# Patient Record
Sex: FEMALE | Race: WHITE
[De-identification: names, ages, dates, MRNs, and addresses within clinical notes are randomized per-mention and may not be internally consistent; named-entity substitution may affect disease eponyms.]

---

## 2019-06-22 ENCOUNTER — HOSPITAL ENCOUNTER (EMERGENCY)
Dept: HOSPITAL 91 - E/R | Age: 49
Discharge: ANOTHER HOSPITAL | End: 2019-06-22
Payer: COMMERCIAL

## 2019-06-22 ENCOUNTER — HOSPITAL ENCOUNTER (EMERGENCY)
Dept: HOSPITAL 10 - E/R | Age: 49
Discharge: ANOTHER HOSPITAL | End: 2019-06-22
Payer: COMMERCIAL

## 2019-06-22 VITALS
HEIGHT: 65 IN | HEIGHT: 65 IN | BODY MASS INDEX: 34.12 KG/M2 | BODY MASS INDEX: 34.12 KG/M2 | WEIGHT: 204.81 LBS | WEIGHT: 204.81 LBS

## 2019-06-22 VITALS — RESPIRATION RATE: 16 BRPM | SYSTOLIC BLOOD PRESSURE: 127 MMHG | DIASTOLIC BLOOD PRESSURE: 96 MMHG | HEART RATE: 75 BPM

## 2019-06-22 DIAGNOSIS — R40.2252: ICD-10-CM

## 2019-06-22 DIAGNOSIS — R40.2362: ICD-10-CM

## 2019-06-22 DIAGNOSIS — H05.011: Primary | ICD-10-CM

## 2019-06-22 DIAGNOSIS — R40.2142: ICD-10-CM

## 2019-06-22 LAB
ADD MAN DIFF?: NO
ANION GAP: 11 (ref 5–13)
BASOPHIL #: 0.1 10^3/UL (ref 0–0.1)
BASOPHILS %: 0.7 % (ref 0–2)
BLOOD UREA NITROGEN: 17 MG/DL (ref 7–20)
CALCIUM: 9.5 MG/DL (ref 8.4–10.2)
CARBON DIOXIDE: 25 MMOL/L (ref 21–31)
CHLORIDE: 105 MMOL/L (ref 97–110)
CREATININE: 0.61 MG/DL (ref 0.44–1)
EOSINOPHILS #: 0.2 10^3/UL (ref 0–0.5)
EOSINOPHILS %: 2.9 % (ref 0–7)
ERYTHROCYTE SEDIMENTATION RATE: 12 MM/HR (ref 0–20)
GLUCOSE: 105 MG/DL (ref 70–220)
HEMATOCRIT: 41.3 % (ref 37–47)
HEMOGLOBIN: 13.8 G/DL (ref 12–16)
IMMATURE GRANS #M: 0.02 10^3/UL (ref 0–0.03)
IMMATURE GRANS % (M): 0.3 % (ref 0–0.43)
INR: 0.93
LYMPHOCYTES #: 2.3 10^3/UL (ref 0.8–2.9)
LYMPHOCYTES %: 31.6 % (ref 15–51)
MEAN CORPUSCULAR HEMOGLOBIN: 29.4 PG (ref 29–33)
MEAN CORPUSCULAR HGB CONC: 33.4 G/DL (ref 32–37)
MEAN CORPUSCULAR VOLUME: 87.9 FL (ref 82–101)
MEAN PLATELET VOLUME: 9.6 FL (ref 7.4–10.4)
MONOCYTE #: 0.6 10^3/UL (ref 0.3–0.9)
MONOCYTES %: 8.3 % (ref 0–11)
NEUTROPHIL #: 4 10^3/UL (ref 1.6–7.5)
NEUTROPHILS %: 56.2 % (ref 39–77)
NUCLEATED RED BLOOD CELLS #: 0 10^3/UL (ref 0–0)
NUCLEATED RED BLOOD CELLS%: 0 /100WBC (ref 0–0)
PARTIAL THROMBOPLASTIN TIME: 27.5 SEC (ref 23–35)
PLATELET COUNT: 286 10^3/UL (ref 140–415)
POTASSIUM: 4.5 MMOL/L (ref 3.5–5.1)
PROTIME: 12.6 SEC (ref 11.9–14.9)
PT RATIO: 1
RED BLOOD COUNT: 4.7 10^6/UL (ref 4.2–5.4)
RED CELL DISTRIBUTION WIDTH: 11.6 % (ref 11.5–14.5)
SODIUM: 141 MMOL/L (ref 135–144)
WHITE BLOOD COUNT: 7.2 10^3/UL (ref 4.8–10.8)

## 2019-06-22 PROCEDURE — 81025 URINE PREGNANCY TEST: CPT

## 2019-06-22 PROCEDURE — 36415 COLL VENOUS BLD VENIPUNCTURE: CPT

## 2019-06-22 PROCEDURE — 85610 PROTHROMBIN TIME: CPT

## 2019-06-22 PROCEDURE — 85651 RBC SED RATE NONAUTOMATED: CPT

## 2019-06-22 PROCEDURE — 96375 TX/PRO/DX INJ NEW DRUG ADDON: CPT

## 2019-06-22 PROCEDURE — 96365 THER/PROPH/DIAG IV INF INIT: CPT

## 2019-06-22 PROCEDURE — 70480 CT ORBIT/EAR/FOSSA W/O DYE: CPT

## 2019-06-22 PROCEDURE — 80048 BASIC METABOLIC PNL TOTAL CA: CPT

## 2019-06-22 PROCEDURE — 85025 COMPLETE CBC W/AUTO DIFF WBC: CPT

## 2019-06-22 PROCEDURE — 96366 THER/PROPH/DIAG IV INF ADDON: CPT

## 2019-06-22 PROCEDURE — 99285 EMERGENCY DEPT VISIT HI MDM: CPT

## 2019-06-22 PROCEDURE — 70545 MR ANGIOGRAPHY HEAD W/DYE: CPT

## 2019-06-22 PROCEDURE — 85730 THROMBOPLASTIN TIME PARTIAL: CPT

## 2019-06-22 PROCEDURE — 70542 MRI ORBIT/FACE/NECK W/DYE: CPT

## 2019-06-22 PROCEDURE — 70450 CT HEAD/BRAIN W/O DYE: CPT

## 2019-06-22 RX ADMIN — ONDANSETRON HYDROCHLORIDE 1 MG: 2 INJECTION, SOLUTION INTRAMUSCULAR; INTRAVENOUS at 22:04

## 2019-06-22 RX ADMIN — IOHEXOL 1 ML: 300 INJECTION, SOLUTION INTRAVENOUS at 14:09

## 2019-06-22 RX ADMIN — VANCOMYCIN HYDROCHLORIDE 1 MLS/HR: 1 INJECTION, POWDER, LYOPHILIZED, FOR SOLUTION INTRAVENOUS at 21:08

## 2019-06-22 RX ADMIN — SODIUM CHLORIDE 1 ML: 9 INJECTION, SOLUTION INTRAMUSCULAR; INTRAVENOUS; SUBCUTANEOUS at 14:09

## 2019-06-22 RX ADMIN — CEFTRIAXONE 1 MLS/HR: 2 INJECTION, SOLUTION INTRAVENOUS at 15:17

## 2019-06-22 RX ADMIN — VASOPRESSIN 1 ML/9 S: 20 INJECTION, SOLUTION INTRAMUSCULAR; SUBCUTANEOUS at 14:09

## 2019-06-22 RX ADMIN — PYRIDOXINE HYDROCHLORIDE 1 MLS/HR: 100 INJECTION, SOLUTION INTRAMUSCULAR; INTRAVENOUS at 22:05

## 2019-06-22 NOTE — EN
Date/Time of Note


Date/Time of Note


DATE: 6/22/19 


TIME: 21:25





ER Progress Note


Patient signed out to me pending transfer for higher level of care.  I spoke to 


Dr. Deutsch ophthalmologist on-call at [facility].  He agreed to accept the patient and transfer arrangements have been 


made.





Patient transferred for higher level of care in stable condition











MADALYN ANDERSON MD             Jun 22, 2019 21:27

## 2019-06-22 NOTE — ERD
ER Documentation


Chief Complaint


Chief Complaint





RT EYE DOUBLE VISION , ONSET YESTERDAY AFTERNOON , ALSO RT SIDE HEADACHE





HPI


48-year-old female presenting with double vision that started yesterday 


afternoon around 12 PM.  She has had associated right eye pain, especially with 


movement with right frontal head pain.  She describes it as a dull ache, worse 


with eye movement.  Rates the pain as an 8 out of 10.  She denies any associated


nausea, vomiting, neck pain, neck stiffness, focal weakness or numbness.  


Patient explains that in September 2017 she had a CSF leak from her nose that 


was spontaneous and she was found to have atraumatic skull fracture as per the 


patient which were repaired at [facility].





ROS


All systems reviewed and are negative except as per history of present illness.





Medications


Home Meds


Reported Medications


Levothyroxine Sodium* (Synthroid*) 50 Mcg Tablet, 50 MCG PO BEFORE BREAKFAST, 


#30 TAB


   6/22/19





Allergies


Allergies:  


Coded Allergies:  


     No Known Allergy (Unverified , 6/22/19)





PMhx/Soc


History of Surgery:  Yes (BRAIN SX 2017)


Anesthesia Reaction:  No


Hx Neurological Disorder:  No


Hx Respiratory Disorders:  No


Hx Cardiac Disorders:  Yes (HIGH CHOLESTEROL)


Hx Psychiatric Problems:  No


Hx Miscellaneous Medical Probl:  Yes (THYROID)


Hx Alcohol Use:  No


Hx Substance Use:  No


Hx Tobacco Use:  No


Smoking Status:  Never smoker





FmHx


Family History:  No diabetes





Physical Exam


Vitals





Vital Signs


  Date      Temp  Pulse  Resp  B/P (MAP)   Pulse Ox  O2          O2 Flow    FiO2


Time                                                 Delivery    Rate


   6/22/19           76    16      128/87        98


     14:30                          (101)


   6/22/19  98.1     83    16      155/94       100


     12:27                          (114)





Physical Exam


Const:   No acute distress


Head:   Atraumatic 


Eyes:    Normal Conjunctiva, subtle anisocoria with right pupil slightly larger 


than left pupil, midsize bilaterally.  PERRLA with no afferent pupillary defect 


bilaterally.  Extraocular movements are intact.  Pain with movement of the right


eye.  Complains of diplopia on exam with EOM testing but has no unilateral 


diplopia.  Mild right periorbital swelling, mostly on the lower aspect of the 


eye.  No maxillary tenderness to palpation.  No facial tenderness to palpation.


ENT:    Normal External Ears, Nose and Mouth.


Neck:               Full range of motion. No meningismus.


Resp:   Clear to auscultation bilaterally


Cardio:   Regular rate and rhythm, no murmurs


Abd:    Soft, non tender, non distended. Normal bowel sounds


Skin:   No petechiae or rashes


Back:   No midline or flank tenderness


Ext:    No cyanosis, or edema


Neur:   Awake and alert, oriented x3, no facial asymmetry, cranial nerves 


intact, strength and sensations intact in all 4 extremities.  Normal gait.


Psych:    Normal Mood and Affect


Result Diagram:  


6/22/19 1300                                                                    


           6/22/19 1300





Results 24 hrs





Laboratory Tests


       Test
                                 6/22/19
13:00  6/22/19
13:22


       White Blood Count                      7.2 10^3/ul


       Red Blood Count                       4.70 10^6/ul


       Hemoglobin                               13.8 g/dl


       Hematocrit                                  41.3 %


       Mean Corpuscular Volume                    87.9 fl


       Mean Corpuscular Hemoglobin                29.4 pg


       Mean Corpuscular Hemoglobin
Concent     33.4 g/dl 
  



       Red Cell Distribution Width                 11.6 %


       Platelet Count                         286 10^3/UL


       Mean Platelet Volume                        9.6 fl


       Immature Granulocytes %                    0.300 %


       Neutrophils %                               56.2 %


       Lymphocytes %                               31.6 %


       Monocytes %                                  8.3 %


       Eosinophils %                                2.9 %


       Basophils %                                  0.7 %


       Nucleated Red Blood Cells %            0.0 /100WBC


       Immature Granulocytes #              0.020 10^3/ul


       Neutrophils #                          4.0 10^3/ul


       Lymphocytes #                          2.3 10^3/ul


       Monocytes #                            0.6 10^3/ul


       Eosinophils #                          0.2 10^3/ul


       Basophils #                            0.1 10^3/ul


       Nucleated Red Blood Cells #            0.0 10^3/ul


       Erythrocyte Sedimentation Rate            12 mm/Hr


       Prothrombin Time                          12.6 Sec


       Prothrombin Time Ratio                         1.0


       INR International Normalized
Ratio           0.93 
  



       Activated Partial
Thromboplast Time      27.5 Sec 
  



       Sodium Level                            141 mmol/L


       Potassium Level                         4.5 mmol/L


       Chloride Level                          105 mmol/L


       Carbon Dioxide Level                     25 mmol/L


       Anion Gap                                       11


       Blood Urea Nitrogen                       17 mg/dl


       Creatinine                              0.61 mg/dl


       Est Glomerular Filtrat Rate
mL/min   > 60 mL/min 
   



       Glucose Level                            105 mg/dl


       Calcium Level                            9.5 mg/dl


       POC Beta HCG, Qualitative                            NEGATIVE





Current Medications


 Medications
   Dose
          Sig/Mohit
       Start Time
   Status  Last


 (Trade)       Ordered        Route
 PRN     Stop Time              Admin
Dose


                              Reason                                Admin


 IV Flush
      10 ml          STK-MED        6/22/19       DC           6/22/19


(NS 10 ml)                    ONCE
 .ROUTE
  14:01
                       14:09



                                             6/22/19 14:02


 Sodium         100 ml @ ud    STK-MED        6/22/19       DC           6/22/19


Chloride                      ONCE
 .ROUTE
  14:01
                       14:09



                                             6/22/19 14:02


 Iohexol
       150 ml         STK-MED        6/22/19       DC           6/22/19


(Omnipaque                    ONCE
 .ROUTE
  14:01
                       14:09



300mg/
 ml)                                  6/22/19 14:02


 Ceftriaxone    50 ml @ 
      ONCE  ONCE
    6/22/19       DC           6/22/19


Sodium         100 mls/hr     IVPB
          15:00
                       15:17



                                             6/22/19 15:29








Procedures/MDM


EMERGENT LABS AND DIAGNOSTIC STUDIES: 


Lab Results above were reviewed and interpreted by me. 





CBC: no anemia or evidence of infection


BMP: No e/o clinically significant electrolyte abnormality severe acidosis, 


alkalosis, renal failure, diabetic ketoacidosis


___________________________________________________________ 


Radiology Results as interpreted by Radiology below were reviewed by CHAD Flores MD: 





CT brain shows no acute abnormalities





CT orbits shows right maxillary soft tissue swelling with no other significant 


abnormalities





MRI orbits:


FINDINGS:


 


There is mild enhancement surrounding the right optic nerve sheath. There is 


also enlargement enhancement of the right inferior rectus muscle with associated


enhancement better appreciated on MRI. There is mild diffuse subtle enhancement 


in the right retrobulbar/post septal soft tissues suggesting mild 


inflammatory/infectious etiologies. There is mild right preseptal 


stranding/enhancement. 


 


No orbital mass is identified. The bilateral optic nerves are normal in size and


signal. Otherwise there is no bilateral optic nerve enhancement. The optic 


chiasm is normal in size and signal without abnormal enhancement. The pituitary 


is normal in size.


 


There is encephalomalacia/gliosis within the left inferior frontal lobe 


suggesting chronic contusion. There is a partially empty sella turcica. There is


no abnormal intracranial enhancement. There is no acute infarction. There is no 


intracranial hemorrhage or extra-axial fluid collection. There is no mass eff


ect.  There is no midline shift. The brainstem is within normal limits. The 


posterior fossa is unremarkable. The normal intracranial, intravascular flow 


voids are preserved. The bilateral Meckel's caves are patulous in size which may


be a normal variant.


 


The visualized paranasal sinuses are well aerated. There is no destructive 


osseous lesion. 


 


IMPRESSION:


 


1. Enhancement surrounding the right optic sheath without abnormal optic nerve 


signal. There is mild right post septal/retrobulbar enhances/inflammatory 


changes as well as enlargement and enhancement of the right inferior rectus 


muscle. Findings are suggestive of infectious/inflammatory right preseptal/post 


septal cellulitis with inflammation of the right optic nerve sheath as well as 


right inferior rectus myositis. This can also be seen with orbital pseudotumor. 


Lymphoma is less likely given pain and entirely excluded. Short-term follow-up 


imaging following treatment/opthalmologic consultation is recommended.


 


2.  Small chronic left inferior frontal contusion.


 


3. Patulous bilateral Meckel's caves which is nonspecific and may be a normal 


variant.


 


4. No acute infarction or intracranial hemorrhage.


 


5. No abnormal intraparenchymal enhancement.





___________________________________________________________ 


Initial Nursing notes reviewed. 


Previous Medical Records requested via the Electronic Health Record. 





EMERGENCY DEPARTMENT COURSE / MEDICAL DECISION MAKING: 





Patient's presentation is concerning for possible early orbital cellulitis.  She


is afebrile and has no signs of stroke on exam.  CT of the orbits did not show 


any significant mass-effect or other abnormalities other than soft tissue 


swelling over the maxillary area.  However her exam and symptoms are most 


consistent with orbital cellulitis as I cannot rule this out at this time.  


Patient was started on IV antibiotics and will require transfer to for higher 


level of care for ophthalmology evaluation and will also likely need a 


neurologic consultation as well.





While the patient was awaiting transfer, MRV was done to rule out cavernous 


sinus thrombosis and was negative.  MRI of the orbits did show inflammation of 


the inferior rectus muscle and optic nerve sheath, which is most consistent with


orbital cellulitis given the clinical findings.





Patient has been in the ER for about 7 hours at time of signout.  We have been 


attempting to transfer her to multiple different hospitals but patient has not 


been accepted yet for transfer.  Patient will be signed out to the oncoming ED 


physician, who will follow up on transferring this patient for higher level of 


care.





Departure


Diagnosis:  


   Primary Impression:  


   Orbital cellulitis, right


   Additional Impression:  


   Diplopia


Condition:  Serious











JEREMIE FLORES MD         Jun 22, 2019 15:07